# Patient Record
Sex: MALE | Race: AMERICAN INDIAN OR ALASKA NATIVE
[De-identification: names, ages, dates, MRNs, and addresses within clinical notes are randomized per-mention and may not be internally consistent; named-entity substitution may affect disease eponyms.]

---

## 2018-06-18 ENCOUNTER — HOSPITAL ENCOUNTER (EMERGENCY)
Dept: HOSPITAL 5 - ED | Age: 23
Discharge: HOME | End: 2018-06-18
Payer: COMMERCIAL

## 2018-06-18 VITALS — DIASTOLIC BLOOD PRESSURE: 81 MMHG | SYSTOLIC BLOOD PRESSURE: 123 MMHG
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
## 2018-06-18 DIAGNOSIS — S76.011A: Primary | ICD-10-CM

## 2018-06-18 PROCEDURE — 99283 EMERGENCY DEPT VISIT LOW MDM: CPT

## 2018-06-18 NOTE — XRAY REPORT
FINAL REPORT



EXAM:  XR HIP 2-3V RT



HISTORY:  hip pain on the right



TECHNIQUE:  AP view of the pelvis and a single coned-down view of

the right hip.



PRIORS:  None.



FINDINGS:  

No evidence for acute fracture or dislocation is seen. Joint

spaces are maintained. The soft tissues are unremarkable. Bony

mineralization is normal.



IMPRESSION:  

No acute soft tissue or bony abnormality noted in the right hip.
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
